# Patient Record
Sex: FEMALE | Race: BLACK OR AFRICAN AMERICAN | NOT HISPANIC OR LATINO | URBAN - METROPOLITAN AREA
[De-identification: names, ages, dates, MRNs, and addresses within clinical notes are randomized per-mention and may not be internally consistent; named-entity substitution may affect disease eponyms.]

---

## 2019-04-12 ENCOUNTER — OFFICE (OUTPATIENT)
Dept: URBAN - METROPOLITAN AREA CLINIC 33 | Facility: CLINIC | Age: 34
End: 2019-04-12

## 2019-04-12 VITALS
DIASTOLIC BLOOD PRESSURE: 74 MMHG | SYSTOLIC BLOOD PRESSURE: 112 MMHG | WEIGHT: 197 LBS | HEART RATE: 64 BPM | TEMPERATURE: 98.1 F | HEIGHT: 67 IN

## 2019-04-12 DIAGNOSIS — K31.89 OTHER DISEASES OF STOMACH AND DUODENUM: ICD-10-CM

## 2019-04-12 DIAGNOSIS — K59.09 OTHER CONSTIPATION: ICD-10-CM

## 2019-04-12 PROCEDURE — 99244 OFF/OP CNSLTJ NEW/EST MOD 40: CPT

## 2019-04-12 NOTE — SERVICEHPINOTES
MARA LUNDY   is a   33   year old    female who is being seen in consultation at the request of   FRANCOIS MUÑOZ   for constipation.BRBMs every 3 days usually but since February has only been going once weekly or not at all and needing to take duclolax to have a BM. Took dulcolax x 3 days  in a row which did not help. She has also been taking senna with only small BMs. Also tried miralax. A few episodes of brbpr when wiping. abd pain related to constipation. Denies family hx of colon cancer but mother and grandmother had polyps. No weight loss she has had a hard time losing weight.BRShe had an EGD in 2015 which revealed focal IM in stomach recall 1 year. She was having upper abdominal pain at the time but states has not had this pain since then.

## 2019-04-22 ENCOUNTER — OFFICE (OUTPATIENT)
Dept: URBAN - METROPOLITAN AREA PATHOLOGY 17 | Facility: PATHOLOGY | Age: 34
End: 2019-04-22
Payer: COMMERCIAL

## 2019-04-22 ENCOUNTER — OFFICE (OUTPATIENT)
Dept: URBAN - METROPOLITAN AREA CLINIC 32 | Facility: CLINIC | Age: 34
End: 2019-04-22

## 2019-04-22 VITALS
TEMPERATURE: 98.1 F | WEIGHT: 197 LBS | DIASTOLIC BLOOD PRESSURE: 64 MMHG | RESPIRATION RATE: 10 BRPM | SYSTOLIC BLOOD PRESSURE: 115 MMHG | RESPIRATION RATE: 19 BRPM | OXYGEN SATURATION: 95 % | HEIGHT: 67 IN | HEART RATE: 70 BPM | RESPIRATION RATE: 13 BRPM | SYSTOLIC BLOOD PRESSURE: 105 MMHG | DIASTOLIC BLOOD PRESSURE: 67 MMHG | RESPIRATION RATE: 16 BRPM | SYSTOLIC BLOOD PRESSURE: 106 MMHG | SYSTOLIC BLOOD PRESSURE: 90 MMHG | HEART RATE: 62 BPM | OXYGEN SATURATION: 99 % | DIASTOLIC BLOOD PRESSURE: 34 MMHG | HEART RATE: 74 BPM | DIASTOLIC BLOOD PRESSURE: 66 MMHG | DIASTOLIC BLOOD PRESSURE: 60 MMHG | OXYGEN SATURATION: 96 % | OXYGEN SATURATION: 100 % | SYSTOLIC BLOOD PRESSURE: 112 MMHG | TEMPERATURE: 99.5 F | DIASTOLIC BLOOD PRESSURE: 49 MMHG | HEART RATE: 49 BPM | HEART RATE: 77 BPM

## 2019-04-22 DIAGNOSIS — K29.80 DUODENITIS WITHOUT BLEEDING: ICD-10-CM

## 2019-04-22 DIAGNOSIS — K29.60 OTHER GASTRITIS WITHOUT BLEEDING: ICD-10-CM

## 2019-04-22 DIAGNOSIS — K31.89 OTHER DISEASES OF STOMACH AND DUODENUM: ICD-10-CM

## 2019-04-22 PROBLEM — D13.1 BENIGN NEOPLASM OF STOMACH: Status: ACTIVE | Noted: 2019-04-22

## 2019-04-22 PROCEDURE — 88313 SPECIAL STAINS GROUP 2: CPT

## 2019-04-22 PROCEDURE — 43239 EGD BIOPSY SINGLE/MULTIPLE: CPT

## 2019-04-22 PROCEDURE — 88342 IMHCHEM/IMCYTCHM 1ST ANTB: CPT

## 2019-04-22 PROCEDURE — 88305 TISSUE EXAM BY PATHOLOGIST: CPT

## 2019-05-14 ENCOUNTER — OFFICE (OUTPATIENT)
Dept: URBAN - METROPOLITAN AREA CLINIC 32 | Facility: CLINIC | Age: 34
End: 2019-05-14
Payer: COMMERCIAL

## 2019-05-14 VITALS
DIASTOLIC BLOOD PRESSURE: 65 MMHG | OXYGEN SATURATION: 98 % | DIASTOLIC BLOOD PRESSURE: 54 MMHG | HEART RATE: 67 BPM | RESPIRATION RATE: 18 BRPM | DIASTOLIC BLOOD PRESSURE: 56 MMHG | TEMPERATURE: 97.7 F | HEART RATE: 72 BPM | DIASTOLIC BLOOD PRESSURE: 59 MMHG | SYSTOLIC BLOOD PRESSURE: 93 MMHG | HEART RATE: 69 BPM | SYSTOLIC BLOOD PRESSURE: 111 MMHG | RESPIRATION RATE: 26 BRPM | HEART RATE: 74 BPM | DIASTOLIC BLOOD PRESSURE: 63 MMHG | SYSTOLIC BLOOD PRESSURE: 102 MMHG | OXYGEN SATURATION: 97 % | RESPIRATION RATE: 22 BRPM | HEART RATE: 76 BPM | SYSTOLIC BLOOD PRESSURE: 122 MMHG | HEART RATE: 70 BPM | HEIGHT: 67 IN | SYSTOLIC BLOOD PRESSURE: 113 MMHG | RESPIRATION RATE: 21 BRPM | SYSTOLIC BLOOD PRESSURE: 106 MMHG | HEART RATE: 54 BPM | OXYGEN SATURATION: 100 % | TEMPERATURE: 97.5 F | HEART RATE: 71 BPM | RESPIRATION RATE: 13 BRPM | WEIGHT: 197 LBS

## 2019-05-14 DIAGNOSIS — K59.09 OTHER CONSTIPATION: ICD-10-CM

## 2019-05-14 PROCEDURE — 45378 DIAGNOSTIC COLONOSCOPY: CPT

## 2019-06-07 ENCOUNTER — OFFICE (OUTPATIENT)
Dept: URBAN - METROPOLITAN AREA CLINIC 33 | Facility: CLINIC | Age: 34
End: 2019-06-07

## 2019-06-07 VITALS
TEMPERATURE: 97.9 F | HEIGHT: 67 IN | DIASTOLIC BLOOD PRESSURE: 76 MMHG | HEART RATE: 51 BPM | SYSTOLIC BLOOD PRESSURE: 102 MMHG | WEIGHT: 192 LBS

## 2019-06-07 DIAGNOSIS — R10.12 LEFT UPPER QUADRANT PAIN: ICD-10-CM

## 2019-06-07 DIAGNOSIS — K59.00 CONSTIPATION, UNSPECIFIED: ICD-10-CM

## 2019-06-07 PROCEDURE — 99214 OFFICE O/P EST MOD 30 MIN: CPT

## 2019-06-07 NOTE — SERVICEHPINOTES
MARA LUNDY   is a   33  female who presents for follow up from procedures. Colonoscopy unremarkable EGD in April with no IM, mild chronic gastritis. She has been taking linzess 290mcg daily with good results, is having normal BMs daily. If she misses a dose, she will not have a BM.  However, she does report LUQ pain since EGD, it is mild and occurs first thing in the mornings when she eats or even drinks water. No other sx.

## 2020-02-06 ENCOUNTER — OFFICE (OUTPATIENT)
Dept: URBAN - METROPOLITAN AREA CLINIC 78 | Facility: CLINIC | Age: 35
End: 2020-02-06

## 2020-02-06 VITALS
WEIGHT: 199 LBS | SYSTOLIC BLOOD PRESSURE: 116 MMHG | TEMPERATURE: 97.3 F | HEIGHT: 67 IN | DIASTOLIC BLOOD PRESSURE: 57 MMHG | HEART RATE: 70 BPM

## 2020-02-06 DIAGNOSIS — R10.11 RIGHT UPPER QUADRANT PAIN: ICD-10-CM

## 2020-02-06 PROCEDURE — 99214 OFFICE O/P EST MOD 30 MIN: CPT

## 2020-02-06 NOTE — SERVICEHPINOTES
MARA LUNDY   is a   34  female who presents for follow up. Started having right sided back pain approx. 2 weeks ago--progressively worsened and moved to [facility]. Worsening pain with eating or drinking anything (even water). the day prior, had fried chicken. Had appt with PCP 1 week ago (1/29) had US on 1/30 and labs--US was normal and she was able to pull labs up on her phone (CBC, CMP, lipase) all wnl. She has been eating low fat foods and restarted low fodmap diet which seems to have helped. Still having pain with eating but not as severe. Heating pad seems to help. No vomiting. Lost 13lbs prior to xmas (intentionally) but then after xmas gained weight back rather quickly. Still taking daily linzess which is working well for her. BRNo change in meds-she took 800mg ibuprofen x 3 days after pain began but otherwise no NSAIDs. She did also try taking zantac x 3 days prior to eating which did nothing and seems to have actually worsened symptoms.GABRIELLEGD 4/2019 with biopsies showing mild chronic gastritis